# Patient Record
Sex: MALE | Race: ASIAN | NOT HISPANIC OR LATINO | Employment: UNEMPLOYED | URBAN - METROPOLITAN AREA
[De-identification: names, ages, dates, MRNs, and addresses within clinical notes are randomized per-mention and may not be internally consistent; named-entity substitution may affect disease eponyms.]

---

## 2018-06-15 ENCOUNTER — AMBULATORY - HEALTHEAST (OUTPATIENT)
Dept: LAB | Facility: CLINIC | Age: 33
End: 2018-06-15

## 2018-06-15 ENCOUNTER — HOSPITAL ENCOUNTER (OUTPATIENT)
Dept: LAB | Age: 33
Setting detail: SPECIMEN
Discharge: HOME | End: 2018-06-15

## 2018-06-15 DIAGNOSIS — Z02.89 REFUGEE HEALTH EXAMINATION: ICD-10-CM

## 2018-06-15 LAB
ALBUMIN SERPL-MCNC: 3.9 G/DL (ref 3.5–5)
ALP SERPL-CCNC: 64 U/L (ref 45–120)
ALT SERPL W P-5'-P-CCNC: 20 U/L (ref 0–45)
ANION GAP SERPL CALCULATED.3IONS-SCNC: 14 MMOL/L (ref 5–18)
AST SERPL W P-5'-P-CCNC: 25 U/L (ref 0–40)
BASOPHILS # BLD AUTO: 0 THOU/UL (ref 0–0.2)
BASOPHILS NFR BLD AUTO: 1 % (ref 0–2)
BILIRUB SERPL-MCNC: 0.8 MG/DL (ref 0–1)
BUN SERPL-MCNC: 9 MG/DL (ref 8–22)
CALCIUM SERPL-MCNC: 9.5 MG/DL (ref 8.5–10.5)
CHLORIDE BLD-SCNC: 105 MMOL/L (ref 98–107)
CO2 SERPL-SCNC: 22 MMOL/L (ref 22–31)
CREAT SERPL-MCNC: 0.9 MG/DL (ref 0.7–1.3)
EOSINOPHIL # BLD AUTO: 0.4 THOU/UL (ref 0–0.4)
EOSINOPHIL NFR BLD AUTO: 6 % (ref 0–6)
ERYTHROCYTE [DISTWIDTH] IN BLOOD BY AUTOMATED COUNT: 12.8 % (ref 11–14.5)
GFR SERPL CREATININE-BSD FRML MDRD: >60 ML/MIN/1.73M2
GLUCOSE BLD-MCNC: 113 MG/DL (ref 70–125)
HAV IGG SER QL IA: POSITIVE
HBV CORE AB SERPL QL IA: POSITIVE
HCT VFR BLD AUTO: 47.6 % (ref 40–54)
HCV AB SERPL QL IA: NEGATIVE
HEPATITIS B SURFACE ANTIBODY LHE- HISTORICAL: NEGATIVE
HGB BLD-MCNC: 16.5 G/DL (ref 14–18)
HIV 1+2 AB+HIV1 P24 AG SERPL QL IA: NEGATIVE
LDLC SERPL CALC-MCNC: 114 MG/DL
LYMPHOCYTES # BLD AUTO: 2.2 THOU/UL (ref 0.8–4.4)
LYMPHOCYTES NFR BLD AUTO: 34 % (ref 20–40)
MCH RBC QN AUTO: 30.4 PG (ref 27–34)
MCHC RBC AUTO-ENTMCNC: 34.7 G/DL (ref 32–36)
MCV RBC AUTO: 88 FL (ref 80–100)
MONOCYTES # BLD AUTO: 0.5 THOU/UL (ref 0–0.9)
MONOCYTES NFR BLD AUTO: 8 % (ref 2–10)
NEUTROPHILS # BLD AUTO: 3.3 THOU/UL (ref 2–7.7)
NEUTROPHILS NFR BLD AUTO: 52 % (ref 50–70)
PLATELET # BLD AUTO: 156 THOU/UL (ref 140–440)
PMV BLD AUTO: 10.2 FL (ref 8.5–12.5)
POTASSIUM BLD-SCNC: 4.3 MMOL/L (ref 3.5–5)
PROT SERPL-MCNC: 7.7 G/DL (ref 6–8)
RBC # BLD AUTO: 5.43 MILL/UL (ref 4.4–6.2)
SODIUM SERPL-SCNC: 141 MMOL/L (ref 136–145)
VZV IGG SER QL IA: POSITIVE
WBC: 6.4 THOU/UL (ref 4–11)

## 2018-06-18 LAB
QTF INTERPRETATION: NORMAL
QTF MITOGEN - NIL: 8.27 IU/ML
QTF NIL: 0.08 IU/ML
QTF RESULT: NEGATIVE
QTF TB ANTIGEN - NIL: -0.03 IU/ML
STRONGYLOIDES IGG SER IA-ACNC: 1.89 IV

## 2018-06-19 LAB — HBV SURFACE AG SERPL QL IA: ABNORMAL

## 2018-06-21 LAB — SCHISTOSOMA IGG SER QL: POSITIVE

## 2018-06-28 ENCOUNTER — OFFICE VISIT - HEALTHEAST (OUTPATIENT)
Dept: FAMILY MEDICINE | Facility: CLINIC | Age: 33
End: 2018-06-28

## 2018-06-28 DIAGNOSIS — N13.30 HYDRONEPHROSIS OF LEFT KIDNEY: ICD-10-CM

## 2018-06-28 DIAGNOSIS — Z02.89 REFUGEE HEALTH EXAMINATION: ICD-10-CM

## 2018-06-28 DIAGNOSIS — B65.9 SCHISTOSOMIASIS: ICD-10-CM

## 2018-06-28 DIAGNOSIS — B18.1 CHRONIC HEPATITIS B (H): ICD-10-CM

## 2018-06-28 ASSESSMENT — MIFFLIN-ST. JEOR: SCORE: 1646.35

## 2018-06-29 LAB — AFP SERPL-MCNC: <2 UG/ML

## 2018-06-30 LAB
HBV E AB SERPL QL IA: POSITIVE
HBV E AG SERPL QL IA: NEGATIVE

## 2018-07-03 ENCOUNTER — COMMUNICATION - HEALTHEAST (OUTPATIENT)
Dept: FAMILY MEDICINE | Facility: CLINIC | Age: 33
End: 2018-07-03

## 2018-07-03 ENCOUNTER — HOSPITAL ENCOUNTER (OUTPATIENT)
Dept: ULTRASOUND IMAGING | Facility: HOSPITAL | Age: 33
Discharge: HOME | End: 2018-07-03
Attending: FAMILY MEDICINE

## 2018-07-03 DIAGNOSIS — N13.30 HYDRONEPHROSIS OF LEFT KIDNEY: ICD-10-CM

## 2018-07-03 DIAGNOSIS — B18.1 CHRONIC HEPATITIS B (H): ICD-10-CM

## 2018-07-03 LAB
HBV DNA SERPL NAA+PROBE-ACNC: 212 [IU]/ML
HBV DNA SERPL NAA+PROBE-LOG IU: 2.3 {LOG_IU}/ML

## 2019-05-08 ENCOUNTER — OFFICE VISIT - HEALTHEAST (OUTPATIENT)
Dept: FAMILY MEDICINE | Facility: CLINIC | Age: 34
End: 2019-05-08

## 2019-05-08 DIAGNOSIS — B18.1 CHRONIC HEPATITIS B (H): ICD-10-CM

## 2019-05-08 DIAGNOSIS — Z28.39 IMMUNIZATION DEFICIENCY: ICD-10-CM

## 2019-05-08 ASSESSMENT — MIFFLIN-ST. JEOR: SCORE: 1636.39

## 2019-05-09 LAB
ALBUMIN SERPL-MCNC: 3.8 G/DL (ref 3.5–5)
ALP SERPL-CCNC: 69 U/L (ref 45–120)
ALT SERPL W P-5'-P-CCNC: 18 U/L (ref 0–45)
ANION GAP SERPL CALCULATED.3IONS-SCNC: 9 MMOL/L (ref 5–18)
AST SERPL W P-5'-P-CCNC: 24 U/L (ref 0–40)
BILIRUB SERPL-MCNC: 0.5 MG/DL (ref 0–1)
BUN SERPL-MCNC: 11 MG/DL (ref 8–22)
CALCIUM SERPL-MCNC: 9 MG/DL (ref 8.5–10.5)
CHLORIDE BLD-SCNC: 106 MMOL/L (ref 98–107)
CO2 SERPL-SCNC: 25 MMOL/L (ref 22–31)
CREAT SERPL-MCNC: 0.82 MG/DL (ref 0.7–1.3)
GFR SERPL CREATININE-BSD FRML MDRD: >60 ML/MIN/1.73M2
GLUCOSE BLD-MCNC: 92 MG/DL (ref 70–125)
POTASSIUM BLD-SCNC: 4 MMOL/L (ref 3.5–5)
PROT SERPL-MCNC: 7.2 G/DL (ref 6–8)
SODIUM SERPL-SCNC: 140 MMOL/L (ref 136–145)

## 2019-05-10 LAB — AFP SERPL-MCNC: <2 UG/ML

## 2019-05-15 LAB
HBV DNA SERPL NAA+PROBE-ACNC: 3480 [IU]/ML
HBV DNA SERPL NAA+PROBE-LOG IU: 3.5 {LOG_IU}/ML

## 2020-02-19 ENCOUNTER — OFFICE VISIT - HEALTHEAST (OUTPATIENT)
Dept: FAMILY MEDICINE | Facility: CLINIC | Age: 35
End: 2020-02-19

## 2020-02-19 DIAGNOSIS — R07.89 LEFT-SIDED CHEST WALL PAIN: ICD-10-CM

## 2020-02-19 DIAGNOSIS — B18.1 CHRONIC HEPATITIS B (H): ICD-10-CM

## 2020-02-19 LAB
ALBUMIN SERPL-MCNC: 4 G/DL (ref 3.5–5)
ALP SERPL-CCNC: 63 U/L (ref 45–120)
ALT SERPL W P-5'-P-CCNC: 26 U/L (ref 0–45)
ANION GAP SERPL CALCULATED.3IONS-SCNC: 11 MMOL/L (ref 5–18)
AST SERPL W P-5'-P-CCNC: 28 U/L (ref 0–40)
BASOPHILS # BLD AUTO: 0.1 THOU/UL (ref 0–0.2)
BASOPHILS NFR BLD AUTO: 1 % (ref 0–2)
BILIRUB SERPL-MCNC: 1.3 MG/DL (ref 0–1)
BUN SERPL-MCNC: 11 MG/DL (ref 8–22)
CALCIUM SERPL-MCNC: 9.3 MG/DL (ref 8.5–10.5)
CHLORIDE BLD-SCNC: 102 MMOL/L (ref 98–107)
CO2 SERPL-SCNC: 26 MMOL/L (ref 22–31)
CREAT SERPL-MCNC: 0.99 MG/DL (ref 0.7–1.3)
EOSINOPHIL # BLD AUTO: 0.3 THOU/UL (ref 0–0.4)
EOSINOPHIL NFR BLD AUTO: 3 % (ref 0–6)
ERYTHROCYTE [DISTWIDTH] IN BLOOD BY AUTOMATED COUNT: 11.9 % (ref 11–14.5)
GFR SERPL CREATININE-BSD FRML MDRD: >60 ML/MIN/1.73M2
GLUCOSE BLD-MCNC: 88 MG/DL (ref 70–125)
HCT VFR BLD AUTO: 47.8 % (ref 40–54)
HGB BLD-MCNC: 16.4 G/DL (ref 14–18)
LYMPHOCYTES # BLD AUTO: 1.9 THOU/UL (ref 0.8–4.4)
LYMPHOCYTES NFR BLD AUTO: 18 % (ref 20–40)
MCH RBC QN AUTO: 31 PG (ref 27–34)
MCHC RBC AUTO-ENTMCNC: 34.2 G/DL (ref 32–36)
MCV RBC AUTO: 91 FL (ref 80–100)
MONOCYTES # BLD AUTO: 0.9 THOU/UL (ref 0–0.9)
MONOCYTES NFR BLD AUTO: 8 % (ref 2–10)
NEUTROPHILS # BLD AUTO: 7.7 THOU/UL (ref 2–7.7)
NEUTROPHILS NFR BLD AUTO: 71 % (ref 50–70)
PLATELET # BLD AUTO: 182 THOU/UL (ref 140–440)
PMV BLD AUTO: 7.7 FL (ref 7–10)
POTASSIUM BLD-SCNC: 3.7 MMOL/L (ref 3.5–5)
PROT SERPL-MCNC: 7.8 G/DL (ref 6–8)
RBC # BLD AUTO: 5.27 MILL/UL (ref 4.4–6.2)
SODIUM SERPL-SCNC: 139 MMOL/L (ref 136–145)
WBC: 10.8 THOU/UL (ref 4–11)

## 2020-02-19 RX ORDER — MELOXICAM 15 MG/1
15 TABLET ORAL DAILY
Qty: 30 TABLET | Refills: 0 | Status: SHIPPED | OUTPATIENT
Start: 2020-02-19 | End: 2022-01-10

## 2020-02-19 ASSESSMENT — MIFFLIN-ST. JEOR: SCORE: 1631.06

## 2020-02-21 LAB
ATRIAL RATE - MUSE: 70 BPM
DIASTOLIC BLOOD PRESSURE - MUSE: NORMAL
HBV DNA SERPL NAA+PROBE-ACNC: ABNORMAL [IU]/ML
HBV DNA SERPL NAA+PROBE-LOG IU: 4.3 {LOG_IU}/ML
INTERPRETATION ECG - MUSE: NORMAL
P AXIS - MUSE: 12 DEGREES
PR INTERVAL - MUSE: 156 MS
QRS DURATION - MUSE: 92 MS
QT - MUSE: 402 MS
QTC - MUSE: 434 MS
R AXIS - MUSE: 43 DEGREES
SYSTOLIC BLOOD PRESSURE - MUSE: NORMAL
T AXIS - MUSE: 36 DEGREES
VENTRICULAR RATE- MUSE: 70 BPM

## 2020-04-01 ENCOUNTER — OFFICE VISIT - HEALTHEAST (OUTPATIENT)
Dept: FAMILY MEDICINE | Facility: CLINIC | Age: 35
End: 2020-04-01

## 2020-04-01 DIAGNOSIS — B18.1 CHRONIC HEPATITIS B (H): ICD-10-CM

## 2020-04-01 DIAGNOSIS — R07.89 CHEST WALL PAIN: ICD-10-CM

## 2020-04-09 ENCOUNTER — RECORDS - HEALTHEAST (OUTPATIENT)
Dept: ADMINISTRATIVE | Facility: OTHER | Age: 35
End: 2020-04-09

## 2020-04-24 ENCOUNTER — RECORDS - HEALTHEAST (OUTPATIENT)
Dept: ADMINISTRATIVE | Facility: OTHER | Age: 35
End: 2020-04-24

## 2020-05-11 ENCOUNTER — RECORDS - HEALTHEAST (OUTPATIENT)
Dept: ADMINISTRATIVE | Facility: OTHER | Age: 35
End: 2020-05-11

## 2021-05-28 NOTE — PROGRESS NOTES
"ASSESMENT AND PLAN:  Diagnoses and all orders for this visit:    Chronic hepatitis B (H)  -     Hepatitis B DNA Quantitative Real-Time PCR(HBQNT)  -     AFP-Tumor Marker  -     Comprehensive Metabolic Panel    Immunization deficiency  Green Card/update imm.  Counseling done on immunization history and requirements with the patient.  Reviewed available immunization history and relevant lab records with patient and family.  Immunizations given as documented in the EHR and on form.  Acetaminophen as needed for post-immunization fever or myalgias.   [employer] form I-693 completed today with the patient.  Given to patient in a sealed labeled envelope and a copy is being scanned into the EHR.  Please see that form for further details.  Patient directed to follow-up in clinic for routine and preventative care.           SUBJECTIVE: 33-year-old male here for his green card exam.  Review of his previous clinic notes indicates he does have a history of hepatitis B.  He is due for his yearly lab follow-up for monitoring of the activity of the chronic viral hepatitis.  He does not have any recent fevers.  No history of immunization reactions or problems in the past.    No past medical history on file.  Patient Active Problem List   Diagnosis     Refugee health examination     Chronic hepatitis B (H)     Hydronephrosis of left kidney     No current outpatient medications on file.     No current facility-administered medications for this visit.      Social History     Tobacco Use   Smoking Status Never Smoker   Smokeless Tobacco Never Used       OBJECTICE: BP 90/62 (Patient Site: Left Arm)   Pulse (!) 59   Temp 98.2  F (36.8  C) (Oral)   Resp 16   Ht 5' 8\" (1.727 m)   Wt 160 lb 4 oz (72.7 kg)   SpO2 98%   BMI 24.37 kg/m       No results found for this or any previous visit (from the past 24 hour(s)).     GEN-alert, appropriate, in no apparent distress   CV-regular rate and rhythm with no " murmur   RESP-lungs clear to auscultation   ABDOMINAL-soft, nontender, no palpable masses or organomegaly     Filiberto Ramirez

## 2021-06-01 VITALS — HEIGHT: 68 IN | BODY MASS INDEX: 24.67 KG/M2 | WEIGHT: 162.75 LBS

## 2021-06-02 VITALS — BODY MASS INDEX: 24.29 KG/M2 | WEIGHT: 160.25 LBS | HEIGHT: 68 IN

## 2021-06-04 VITALS
TEMPERATURE: 98.2 F | WEIGHT: 157.31 LBS | RESPIRATION RATE: 18 BRPM | OXYGEN SATURATION: 98 % | SYSTOLIC BLOOD PRESSURE: 104 MMHG | DIASTOLIC BLOOD PRESSURE: 68 MMHG | BODY MASS INDEX: 23.3 KG/M2 | HEART RATE: 82 BPM | HEIGHT: 69 IN

## 2021-06-06 NOTE — PATIENT INSTRUCTIONS - HE
Chest Pain: Begin taking Mobic medication every morning regardless if you have pain.     Try to reduce alcohol intake.

## 2021-06-06 NOTE — PROGRESS NOTES
ASSESSMENT AND PLAN:  1. Left-sided chest wall pain  Patient had left-sided chest pain for 1 months duration stop working 2 weeks ago because of this pain the pain lasts between a few minutes to 1 hour.  Exacerbated by heavy lifting and walking distances no diaphoresis noted sometimes the chest pain awoke him from sleep when he was working.  Hemogram reviewed with the patient EKG reviewed today normal sinus rhythm chest x-ray showed no evidence of cardiomegaly.  Meloxicam started for chest wall discomfort.  Chest pain is been episodic and not related to exertion differential would include coronary artery syndrome, GERD, and musculoskeletal pain.  - Comprehensive Metabolic Panel  - Electrocardiogram Perform and Read  - XR Chest 2 Views  - HM1(CBC and Differential)  - HM1 (CBC with Diff)  - meloxicam (MOBIC) 15 MG tablet; Take 1 tablet (15 mg total) by mouth daily.  Dispense: 30 tablet; Refill: 0    2. Chronic hepatitis B (H)  Labs reviewed for his hepatitis B repeating hepatitis B DNA quantitative test today.  Patient should not be drinking any alcohol  - Hepatitis B DNA Quantitative Real-Time PCR(HBQNT)            Orders Placed This Encounter   Procedures     XR Chest 2 Views     Order Specific Question:   Reason for Exam (Describe Symptoms):     Answer:   cough     Order Specific Question:   Can the procedure be changed per Radiologist protocol?     Answer:   Yes     Comprehensive Metabolic Panel     Hepatitis B DNA Quantitative Real-Time PCR(HBQNT)     Order Specific Question:   Is the patient HBs antigen positive?     Answer:   Yes     HM1 (CBC with Diff)     Electrocardiogram Perform and Read     There are no discontinued medications.    No follow-ups on file.    CHIEF COMPLAINT:  Chief Complaint   Patient presents with     Muscle pain     Chest pain happens when he picks up heavy objects towards the right side of chest.       HISTORY OF PRESENT ILLNESS:  Alexander is a 34 y.o. male who presents to the clinic today  "for chest pains persisting a month. He notes onset was slow but now he notices the ache on is right side when lifting heavy objects.  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 Alexander does not believe the work caused his chest pain. He describes the as throbbing and lasting under an hour. Alexander has not tried medications for the pain but has found relief with resting. He confirms when he was still working the pain was waking him up at night but now since he stopped it has not. Alexander states sometimes he gets sharp chest pains that last less than a minute.       REVIEW OF SYSTEMS:   Admit to left side chest pain, fatigue, shortness of breath with walking, and intermittent dysuria.  Denies shortness of breath with incline, sweating, neck pain, and peripheral edema.  All other 10 point review  systems are negative.    PFSH:  Alexander is present with a ReelGenie .     TOBACCO USE:  Social History     Tobacco Use   Smoking Status Never Smoker   Smokeless Tobacco Never Used       VITALS:  Vitals:    02/19/20 1257   BP: 104/68   Pulse: 82   Resp: 18   Temp: 98.2  F (36.8  C)   TempSrc: Oral   SpO2: 98%   Weight: 157 lb 5 oz (71.4 kg)   Height: 5' 8.5\" (1.74 m)     Wt Readings from Last 3 Encounters:   02/19/20 157 lb 5 oz (71.4 kg)   05/08/19 160 lb 4 oz (72.7 kg)   06/28/18 162 lb 12 oz (73.8 kg)     Body mass index is 23.57 kg/m .      PHYSICAL EXAM:  General: Alert, cooperative, no distress, appears stated age  Neck: No bruits.   Throat: Lips, mucosa, and tongue normal; teeth and gums normal  Back: Symmetric, no curvature, ROM normal, no CVA tenderness  Lungs: Clear to auscultation bilaterally, respirations unlabored  Chest wall: No tenderness or deformity  Heart: Regular rate and rhythm, S1 and S2 normal, no murmur, rub, or gallop  Abdomen: Soft, non tender, bowel sounds active all four quadrants, no " masses, no organomegaly.  Neurologic:  A & O x 3.  No tremor, no focal findings.          DATA REVIEWED:  Additional History from Old Records Summarized (2): Reviewed green card physical Dr. Ramirez  Decision to Obtain Records (1): None  Radiology Tests Summarized or Ordered (1): Reviewed XR Chest  Labs Reviewed or Ordered (1): ordered and reviewed  Medicine Test Summarized or Ordered (1): EKG ordered.  Independent Review of EKG or X-RAY(2 each): EKG: Normal Sinus Rhythm.  Rates 70  XR Chest: No cardiomegaly. No fluid or infiltrate.     The visit lasted a total of 24 minutes face to face with the patient. Over 50% of the time was spent counseling and educating the patient about chest pain.    Nixon RIVERA, am scribing for and in the presence of, Dr. Mesa.    IDr. Mesa, personally performed the services described in this documentation, as scribed by Nixon Dawson in my presence, and it is both accurate and complete.      MEDICATIONS:  Current Outpatient Medications   Medication Sig Dispense Refill     meloxicam (MOBIC) 15 MG tablet Take 1 tablet (15 mg total) by mouth daily. 30 tablet 0     No current facility-administered medications for this visit.        Total Data Points: 7    Please note that this clinical encounter uses voice recognition software, there may be typographical errors present

## 2021-06-07 NOTE — PROGRESS NOTES
"Alexander Alcocer is a 34 y.o. male who is being evaluated via a billable telephone visit.      The patient has been notified of following:     \"This telephone visit will be conducted via a call between you and your physician/provider. We have found that certain health care needs can be provided without the need for a physical exam.  This service lets us provide the care you need with a short phone conversation.  If a prescription is necessary we can send it directly to your pharmacy.  If lab work is needed we can place an order for that and you can then stop by our lab to have the test done at a later time.    If during the course of the call the physician/provider feels a telephone visit is not appropriate, you will not be charged for this service.\"     Patient has given verbal consent to a Telephone visit? Yes    Alexander Alcocer is following up for upper chest pain and lab tests regarding his liver    I have reviewed and updated the patient's Past Medical History, Social History, Family History and Medication List.    ALLERGIES  Patient has no known allergies.    Additional provider notes: Patient is a 34-year-old male with chronic hepatitis B.  He saw me approximately 5 weeks ago for chronic chest pain.  At that time the exam showed no abnormal findings he tried meloxicam and his chest pain is now absent he has not used the medication for more than 2 weeks he has not any chest pain for more than 3 weeks.  He denies using paracetamol or Tylenol denies drinking any alcohol he denies any chest pain, abdominal pain, nausea, vomiting bloating or diarrhea or shortness of breath he notes no fever or chills    Assessment/Plan:  1. Chronic hepatitis B (H)  Patient had a blood test which consisted of a CMP which was in normal limits however his hepatitis B DNA quantitative test was above 20,000 and the logarithmic test was above 4 therefore I am going to refer him to gastro for potential interferon therapy since I am worried that this is " a gross elevation from prior test.  Patient understands that he may have a telephone visit.  - Ambulatory referral to Gastroenterology    2. Chest wall pain    No chest pain noted if symptoms recur he can contact me again.    kya phone  present    Phone call duration:  15  minutes    Aniket Mesa MD

## 2021-06-16 PROBLEM — Z02.89 REFUGEE HEALTH EXAMINATION: Status: ACTIVE | Noted: 2018-06-28

## 2021-06-16 PROBLEM — N13.30 HYDRONEPHROSIS OF LEFT KIDNEY: Status: ACTIVE | Noted: 2018-06-28

## 2021-06-16 PROBLEM — B18.1 CHRONIC HEPATITIS B (H): Status: ACTIVE | Noted: 2018-06-28

## 2021-06-19 NOTE — PROGRESS NOTES
ASSESMENT AND PLAN:    Refugee Screening - Reviewed overseas paperwork.  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  Reviewed refugee screening labs.  Immunization review and update done, see flowsheet.  Refugee mental health screen done, see results below.  Reviewed healthy lifestyle issues and resettlement issues.  Encouraged dental follow-up in coordination with [facility].  Discussed age appropriate cancer screening but no invasive exam or testing done on initial visit with new arrival refugee patients.    -     Tdap vaccine greater than or equal to 8yo IM    Chronic hepatitis B (H)  -     US Abdomen Complete; Future; Expected date: 6/28/18  -     Hepatitis B DNA Quantitative Real-Time PCR(HBQNT)($$$)  -     Hepatitis Be Antigen  -     Hepatitis Be Antibody (Anti-HBe)  -     AFP-Tumor Marker  We will call the patient with results with the help of a professional [employer] , he was counseled that and thus informed otherwise during the telephone call, he will need to be following up yearly for monitoring of inactive hepatitis B.  If he does have active disease then he will require closer follow-up.  His wife is hepatitis B surface antigen negative and hepatitis B surface antibody positive.    Hydronephrosis of left kidney  -     US Abdomen Complete; Future; Expected date: 6/28/18    Schistosomiasis  -     praziquantel (BILTRICIDE) 600 mg tablet; Take 2 tablets (1,200 mg total) by mouth 3 (three) times a day for 1 day.  Dispense: 6 tablet; Refill: 0          HPI: 32-year-old male seen for refugee screening.  He does have positives Schistosoma antibody testing.  He reports that he does get some intermittent mild diarrhea and intermittent mild abdominal pain.  Patient's overseas IO records indicate that he has a history of a left sided abnormal kidney ultrasound.  Also that his blood tests were  "positive for hepatitis B.    ROS: No chest pain, no shortness of breath, no skin lesions that have been changing, no blood in the urine, no blood in the stool, remainder review of systems is as above or negative.    No past medical history on file.    Social History     Social History     Marital status:      Spouse name: N/A     Number of children: N/A     Years of education: N/A     Social History Main Topics     Smoking status: Never Smoker     Smokeless tobacco: Never Used     Alcohol use Yes      Comment: sometime      Drug use: None     Sexual activity: Not Asked     Other Topics Concern     None     Social History Narrative     None       OBJECTICE: BP 94/70  Pulse 82  Temp 97.4  F (36.3  C) (Oral)   Resp 16  Ht 5' 7.91\" (1.725 m)  Wt 162 lb 12 oz (73.8 kg)  SpO2 94%  BMI 24.81 kg/m2   Gen - alert, orientated, NAD  Eyes - fundascopic exam limited by the undialated pupil but looks symmetric  ENT - oropharynx clear, TMs clear  Neck - supple, no palpable mass or lymphadenopathy  CV - RRR, no murmur  Resp - lungs CTA  Ab - soft, nontender, no palpable mass or organomegaly   - normal appearance to the external genetalia, normal testicular exam bilaterally, no hernia  Extrem - warm, no edema  Neuro - CN II-XII intact, strength, sensation, reflexes intact and symmetric  Skin - no rash, no atypical appearing lesions seen.     Recent Results (from the past 672 hour(s))   QTF-Mycobacterium tuberculosis by QuantiFERON-TB Gold    Collection Time: 06/15/18  9:34 AM   Result Value Ref Range    QTF RESULT Negative Negative    QTF INTREPRETATION       No interferon-gamma response to M. tuberculosis antigens was detected.  Infecton with M. tuberculosis is unlikely.  A negative result alone does not exclude infection with M. tuberculosis    QTF NIL 0.08 IU/mL    QTF TB ANTIGEN - NIL -0.03 IU/mL    QTF MITOGEN - NIL 8.27 IU/mL   Hepatitis B Surface Antibody (Anti-HBs)    Collection Time: 06/15/18  9:34 AM "   Result Value Ref Range    Hep B Surface Antibody Negative Negative   Hepatitis B Surface Antigen (HBsAG)    Collection Time: 06/15/18  9:34 AM   Result Value Ref Range    Hepatitis B Surface Ag Positive, Confirm (!) Negative   Hepatitis B Core Antibody (Anti-HBc)    Collection Time: 06/15/18  9:34 AM   Result Value Ref Range    Hep B Core Total Ab Positive (!) Negative   Hepatitis C Antibody (Anti-HCV)    Collection Time: 06/15/18  9:34 AM   Result Value Ref Range    Hepatitis C Ab Negative Negative   Hepatitis A Immune Status    Collection Time: 06/15/18  9:34 AM   Result Value Ref Range    Hepatitis A IgG (Immune Status) Positive Positive   HIV Antigen/Antibody Screening Cascade    Collection Time: 06/15/18  9:34 AM   Result Value Ref Range    HIV Antigen / Antibody Negative Negative   Comprehensive Metabolic Panel    Collection Time: 06/15/18  9:34 AM   Result Value Ref Range    Sodium 141 136 - 145 mmol/L    Potassium 4.3 3.5 - 5.0 mmol/L    Chloride 105 98 - 107 mmol/L    CO2 22 22 - 31 mmol/L    Anion Gap, Calculation 14 5 - 18 mmol/L    Glucose 113 70 - 125 mg/dL    BUN 9 8 - 22 mg/dL    Creatinine 0.90 0.70 - 1.30 mg/dL    GFR MDRD Af Amer >60 >60 mL/min/1.73m2    GFR MDRD Non Af Amer >60 >60 mL/min/1.73m2    Bilirubin, Total 0.8 0.0 - 1.0 mg/dL    Calcium 9.5 8.5 - 10.5 mg/dL    Protein, Total 7.7 6.0 - 8.0 g/dL    Albumin 3.9 3.5 - 5.0 g/dL    Alkaline Phosphatase 64 45 - 120 U/L    AST 25 0 - 40 U/L    ALT 20 0 - 45 U/L   LDL Cholesterol, Direct    Collection Time: 06/15/18  9:34 AM   Result Value Ref Range    Direct  <=129 mg/dl   Varicella Zoster Antibody, IgG    Collection Time: 06/15/18  9:34 AM   Result Value Ref Range    Varicella Zoster Antibody IgG Positive    Strongyloides Antibody, IgG By DAKOTA, Serum    Collection Time: 06/15/18  9:34 AM   Result Value Ref Range    Strongyloides Antibody, IgG By DAKOTA 1.89 (H) <=0.99 IV   HM1 (CBC with Diff)    Collection Time: 06/15/18  9:34 AM    Result Value Ref Range    WBC 6.4 4.0 - 11.0 thou/uL    RBC 5.43 4.40 - 6.20 mill/uL    Hemoglobin 16.5 14.0 - 18.0 g/dL    Hematocrit 47.6 40.0 - 54.0 %    MCV 88 80 - 100 fL    MCH 30.4 27.0 - 34.0 pg    MCHC 34.7 32.0 - 36.0 g/dL    RDW 12.8 11.0 - 14.5 %    Platelets 156 140 - 440 thou/uL    MPV 10.2 8.5 - 12.5 fL    Neutrophils % 52 50 - 70 %    Lymphocytes % 34 20 - 40 %    Monocytes % 8 2 - 10 %    Eosinophils % 6 0 - 6 %    Basophils % 1 0 - 2 %    Neutrophils Absolute 3.3 2.0 - 7.7 thou/uL    Lymphocytes Absolute 2.2 0.8 - 4.4 thou/uL    Monocytes Absolute 0.5 0.0 - 0.9 thou/uL    Eosinophils Absolute 0.4 0.0 - 0.4 thou/uL    Basophils Absolute 0.0 0.0 - 0.2 thou/uL   Schistosoma Antibody, IgG    Collection Time: 06/15/18  9:34 AM   Result Value Ref Range    Schistosoma Antibody, IgG Positive          Mental Health screening:  Q1. Mood low or feeling sad: No  Q2, Thinking too much: No  Q3. Bad dreams/nightmares: No  Q4. Have you been avoiding situations that remind you of the past? No  Q5: Are there things that make it difficult to do what you need to do on a daily basis or be able to School/work/daily life: No  Referral Indicated: Annabel Ramirez   5:02 PM 6/28/2018

## 2022-01-10 ENCOUNTER — OFFICE VISIT (OUTPATIENT)
Dept: FAMILY MEDICINE | Facility: CLINIC | Age: 37
End: 2022-01-10
Payer: COMMERCIAL

## 2022-01-10 VITALS
BODY MASS INDEX: 23.52 KG/M2 | RESPIRATION RATE: 20 BRPM | TEMPERATURE: 98.7 F | HEART RATE: 86 BPM | SYSTOLIC BLOOD PRESSURE: 116 MMHG | OXYGEN SATURATION: 100 % | DIASTOLIC BLOOD PRESSURE: 74 MMHG | WEIGHT: 157 LBS

## 2022-01-10 DIAGNOSIS — J06.9 VIRAL URI WITH COUGH: Primary | ICD-10-CM

## 2022-01-10 DIAGNOSIS — R50.9 FEVER, UNSPECIFIED FEVER CAUSE: ICD-10-CM

## 2022-01-10 DIAGNOSIS — R05.9 COUGH: ICD-10-CM

## 2022-01-10 PROCEDURE — 99213 OFFICE O/P EST LOW 20 MIN: CPT | Performed by: NURSE PRACTITIONER

## 2022-01-10 PROCEDURE — U0005 INFEC AGEN DETEC AMPLI PROBE: HCPCS | Performed by: NURSE PRACTITIONER

## 2022-01-10 PROCEDURE — U0003 INFECTIOUS AGENT DETECTION BY NUCLEIC ACID (DNA OR RNA); SEVERE ACUTE RESPIRATORY SYNDROME CORONAVIRUS 2 (SARS-COV-2) (CORONAVIRUS DISEASE [COVID-19]), AMPLIFIED PROBE TECHNIQUE, MAKING USE OF HIGH THROUGHPUT TECHNOLOGIES AS DESCRIBED BY CMS-2020-01-R: HCPCS | Performed by: NURSE PRACTITIONER

## 2022-01-10 RX ORDER — IBUPROFEN 600 MG/1
600 TABLET, FILM COATED ORAL EVERY 6 HOURS PRN
Qty: 40 TABLET | Refills: 0 | Status: SHIPPED | OUTPATIENT
Start: 2022-01-10

## 2022-01-10 ASSESSMENT — ENCOUNTER SYMPTOMS
HEADACHES: 1
FEVER: 1
SORE THROAT: 1
DIZZINESS: 1

## 2022-01-10 NOTE — LETTER
[address] [address]  Phone: [phone]  Fax: [phone]    January 10, 2022        Htwa Leodan  [facility] [place] [address]          To whom it may concern:    RE: Htwa Leodan    Patient was seen and treated today at our clinic and missed work. Please excuse for 1-2 days pending covid test results and then per CDC guidelines.      Please contact me for questions or concerns.      Sincerely,        Funmi Mcleod, CNP

## 2022-01-10 NOTE — PROGRESS NOTES
Assessment & Plan     Fever, unspecified fever cause    - Symptomatic; Yes; 1/7/2022 COVID-19 Virus (Coronavirus) by PCR Nose  - ibuprofen (ADVIL/MOTRIN) 600 MG tablet  Dispense: 40 tablet; Refill: 0    Cough    - Symptomatic; Yes; 1/7/2022 COVID-19 Virus (Coronavirus) by PCR Nose  - ibuprofen (ADVIL/MOTRIN) 600 MG tablet  Dispense: 40 tablet; Refill: 0    Viral URI with cough       +Karenni language if covid positive.      Focused exam and history done due to COVID-19 pandemic in a walk-in setting.  Explained we will call with COVID results only if positive.  Work note provided.    History, exam, and vital signs consistent with a viral URI.    No red flags. No cough seen during entirety of exam    Isolate pending covid results.      Recheck if shortness of breath or new fevers develop.  Rest.     OTCs recommended: None [   ].  Dextromethorphan  [  ], guaifenesin [  ], pseudoephedrine [   ], Afrin for no greater than 3 days [  ], Tylenol or ibuprofen [x ].        15 minutes spent on the date of the encounter doing chart review, patient visit and documentation         No follow-ups on file.    Funmi Mcleod [facility]     Marywa is a 36 year old male who presents to clinic today for the following health issues:  Chief Complaint   Patient presents with     Cough     and fever yesterday, x 3 days, slight dizziness, no throat pain, headaches     HPI    Agree with MA note.      No measured fever - +subjective.     No known flu exposures nor covid.      Immunizations: 2 covid Pfizer.      +Shortness of breath and pain in chest with cough.      No h/o asthma.         Review of Systems   Constitutional: Positive for fever (Subjective only).   HENT: Positive for sore throat (In the morning).    Neurological: Positive for dizziness and headaches.     See HPI       Objective    /74 (BP Location: Right arm, Patient Position: Sitting, Cuff Size: Adult Regular)   Pulse 86    Temp 98.7  F (37.1  C) (Oral)   Resp 20   Wt 71.2 kg (157 lb)   SpO2 100%   BMI 23.52 kg/m    Physical Exam  Constitutional:       Appearance: He is well-developed.   HENT:      Right Ear: External ear normal.      Left Ear: External ear normal.      Nose: No congestion.   Eyes:      General:         Right eye: No discharge.         Left eye: No discharge.      Conjunctiva/sclera: Conjunctivae normal.   Cardiovascular:      Rate and Rhythm: Normal rate and regular rhythm.      Pulses: Normal pulses.      Heart sounds: Normal heart sounds.   Pulmonary:      Effort: Pulmonary effort is normal.      Breath sounds: Normal breath sounds.   Musculoskeletal:         General: Normal range of motion.   Skin:     General: Skin is warm.   Neurological:      Mental Status: He is alert and oriented to person, place, and time.   Psychiatric:         Mood and Affect: Mood normal.         Behavior: Behavior normal.         Thought Content: Thought content normal.         Judgment: Judgment normal.

## 2022-01-11 LAB — SARS-COV-2 RNA RESP QL NAA+PROBE: POSITIVE

## 2022-01-12 ENCOUNTER — TELEPHONE (OUTPATIENT)
Dept: FAMILY MEDICINE | Facility: CLINIC | Age: 37
End: 2022-01-12
Payer: COMMERCIAL

## 2022-01-12 NOTE — TELEPHONE ENCOUNTER
"Coronavirus (COVID-19) Notification    Caller Name (Patient, parent, daughter/son, grandparent, etc)  Patient   # [id number] on call  Reason for call  Notify of Positive Coronavirus (COVID-19) lab results, assess symptoms,  review  [facility] recommendations    Lab Result    Lab test:  2019-nCoV rRt-PCR or SARS-CoV-2 PCR    Oropharyngeal AND/OR nasopharyngeal swabs is POSITIVE for 2019-nCoV RNA/SARS-COV-2 PCR (COVID-19 virus)    RN Recommendations/Instructions per [facility] Coronavirus COVID-19 recommendations    Brief introduction script  Introduce self then review script:  \"I am calling on behalf of "[employer].  We were notified that your Coronavirus test (COVID-19) for was POSITIVE for the virus.  I have some information to relay to you but first I wanted to mention that the [place] Dept of Health will be contacting you shortly [it's possible MD already called Patient] to talk to you more about how you are feeling and other people you have had contact with who might now also have the virus.  Also,  [employer] is Partnering with the [facility] for Covid-19 research, you may be contacted directly by research staff.\"    Assessment (Inquire about Patient's current symptoms)   Assessment   Current Symptoms at time of phone call: (if no symptoms, document No symptoms] Cough, headache   Symptoms onset (if applicable) 1/5/22     If at time of call, Patients symptoms hare worsened, the Patient should contact 911 or have someone drive them to Emergency Dept promptly:      If Patient calling 911, inform 911 personal that you have tested positive for the Coronavirus (COVID-19).  Place mask on and await 911 to arrive.    If Emergency Dept, If possible, please have another adult drive you to the Emergency Dept but you need to wear mask when in contact with other people.      Monoclonal Antibody Administration    You may be eligible to receive a new treatment with a monoclonal antibody for " preventing hospitalization in patients at high risk for complications from COVID-19.   This medication is still experimental and available on a limited basis; it is given through an IV and must be given at an infusion center. Please note that not all people who are eligible will receive the medication since it is in limited supply.     Are you interested in being considered for this medication?  No.   Does the patient fit the criteria: No    Review information with Patient    Your result was positive. This means you have COVID-19 (coronavirus).  We have sent you a letter that reviews the information that I'll be reviewing with you now.    How can I protect others?    If you have symptoms: stay home and away from others (self-isolate) until:    You've had no fever--and no medicine that reduces fever--for 1 full day (24 hours). And       Your other symptoms have gotten better. For example, your cough or breathing has improved. And     At least 10 days have passed since your symptoms started. (If you've been told by a doctor that you have a weak immune system, wait 20 days.)     If you don't have symptoms: Stay home and away from others (self-isolate) until at least 10 days have passed since your first positive COVID-19 test. (Date test collected)    During this time:    Stay in your own room, including for meals. Use your own bathroom if you can.    Stay away from others in your home. No hugging, kissing or shaking hands. No visitors.     Don't go to work, school or anywhere else.     Clean  high touch  surfaces often (doorknobs, counters, handles, etc.). Use a household cleaning spray or wipes. You'll find a full list on the EPA website at www.epa.gov/pesticide-registration/list-n-disinfectants-use-against-sars-cov-2.     Cover your mouth and nose with a mask, tissue or other face covering to avoid spreading germs.    Wash your hands and face often with soap and water.    Make a list of people you have been in close  contact with recently, even if either of you wore a face covering.   - Start your list from 2 days before you became ill or had a positive test.  - Include anyone that was within 6 feet of you for a cumulative total of 15 minutes or more in 24 hours. (Example: if you sat next to Maxime for 5 minutes in the morning and 10 minutes in the afternoon, then you were in close contact for 15 minutes total that day. Maxime would be added to your list.)    A public health worker will call or text you. It is important that you answer. They will ask you questions about possible exposures to COVID-19, such as people you have been in direct contact with and places you have visited.    Tell the people on your list that you have COVID-19; they should stay away from others for 14 days starting from the last time they were in contact with you (unless you are told something different from a public health worker).     Caregivers in these groups are at risk for severe illness due to COVID-19:  o People 65 years and older  o People who live in a nursing home or long-term care facility  o People with chronic disease (lung, heart, cancer, diabetes, kidney, liver, immunologic)  o People who have a weakened immune system, including those who:  - Are in cancer treatment  - Take medicine that weakens the immune system, such as corticosteroids  - Had a bone marrow or organ transplant  - Have an immune deficiency  - Have poorly controlled HIV or AIDS  - Are obese (body mass index of 40 or higher)  - Smoke regularly    Caregivers should wear gloves while washing dishes, handling laundry and cleaning bedrooms and bathrooms.    Wash and dry laundry with special caution. Don't shake dirty laundry, and use the warmest water setting you can.    If you have a weakened immune system, ask your doctor about other actions you should take.    For more tips, go to www.cdc.gov/coronavirus/2019-ncov/downloads/10Things.pdf.    You should not go back to work until  you meet the guidelines above for ending your home isolation. You don't need to be retested for COVID-19 before going back to work--studies show that you won't spread the virus if it's been at least 10 days since your symptoms started (or 20 days, if you have a weak immune system).    Employers: This document serves as formal notice of your employee's medical guidelines for going back to work. They must meet the above guidelines before going back to work in person.    How can I take care of myself?    1. Get lots of rest. Drink extra fluids (unless a doctor has told you not to).    2. Take Tylenol (acetaminophen) for fever or pain. If you have liver or kidney problems, ask your family doctor if it's okay to take Tylenol.     Take either:     650 mg (two 325 mg pills) every 4 to 6 hours, or     1,000 mg (two 500 mg pills) every 8 hours as needed.     Note: Don't take more than 3,000 mg in one day. Acetaminophen is found in many medicines (both prescribed and over-the-counter medicines). Read all labels to be sure you don't take too much.    For children, check the Tylenol bottle for the right dose (based on their age or weight).    3. If you have other health problems (like cancer, heart failure, an organ transplant or severe kidney disease): Call your specialty clinic if you don't feel better in the next 2 days.    4. Know when to call 911: Emergency warning signs include:    Trouble breathing or shortness of breath    Pain or pressure in the chest that doesn't go away    Feeling confused like you haven't felt before, or not being able to wake up    Bluish-colored lips or face    5. Sign up for [employer]. We know it's scary to hear that you have COVID-19. We want to track your symptoms to make sure you're okay over the next 2 weeks. Please look for an email from [employer]--this is a free, online program that we'll use to keep in touch. To sign up, follow the link in the email. Learn more at  [email or web address].    Where can I get more information?    [facility]: [email or web address]    Coronavirus Basics: www.health.[facility].[place]/diseases/coronavirus/basics.html    What to Do If You're Sick: www.cdc.gov/coronavirus/2019-ncov/about/steps-when-sick.html    Ending Home Isolation: www.cdc.gov/coronavirus/2019-ncov/hcp/disposition-in-home-patients.html     Caring for Someone with COVID-19: www.cdc.gov/coronavirus/2019-ncov/if-you-are-sick/care-for-someone.html     [facility] clinical trials (COVID-19 research studies): clinicalaffairs.[facility]-clinical-trials     A Positive COVID-19 letter will be sent via [employer] or the mail. (Exception, no letters sent to Presurgerical/Preprocedure Patients)  Patient asking for appointments for testing for family members.  Transferred patient and  to scheduling line.  Daniela Elias LPN